# Patient Record
Sex: MALE | Race: BLACK OR AFRICAN AMERICAN | NOT HISPANIC OR LATINO | Employment: UNEMPLOYED | URBAN - METROPOLITAN AREA
[De-identification: names, ages, dates, MRNs, and addresses within clinical notes are randomized per-mention and may not be internally consistent; named-entity substitution may affect disease eponyms.]

---

## 2024-04-25 ENCOUNTER — OFFICE VISIT (OUTPATIENT)
Dept: BEHAVIORAL HEALTH | Facility: CLINIC | Age: 69
End: 2024-04-25
Payer: MEDICARE

## 2024-04-25 VITALS — SYSTOLIC BLOOD PRESSURE: 117 MMHG | HEART RATE: 81 BPM | DIASTOLIC BLOOD PRESSURE: 68 MMHG

## 2024-04-25 DIAGNOSIS — F14.11 HISTORY OF COCAINE ABUSE (MULTI): Primary | ICD-10-CM

## 2024-04-25 LAB
AMPHETAMINES UR QL SCN: ABNORMAL
BARBITURATES UR QL SCN: ABNORMAL
BZE UR QL SCN: ABNORMAL
CANNABINOIDS UR QL SCN: ABNORMAL
CREAT UR-MCNC: 268 MG/DL (ref 20–370)
PCP UR QL SCN: ABNORMAL

## 2024-04-25 PROCEDURE — 1125F AMNT PAIN NOTED PAIN PRSNT: CPT | Performed by: COUNSELOR

## 2024-04-25 PROCEDURE — 82570 ASSAY OF URINE CREATININE: CPT | Mod: 59 | Performed by: NURSE PRACTITIONER

## 2024-04-25 PROCEDURE — 90791 PSYCH DIAGNOSTIC EVALUATION: CPT | Performed by: COUNSELOR

## 2024-04-25 PROCEDURE — 80353 DRUG SCREENING COCAINE: CPT | Performed by: NURSE PRACTITIONER

## 2024-04-25 PROCEDURE — 80346 BENZODIAZEPINES1-12: CPT | Performed by: NURSE PRACTITIONER

## 2024-04-25 ASSESSMENT — PAIN SCALES - GENERAL: PAINLEVEL: 7

## 2024-04-25 NOTE — PROGRESS NOTES
"ARS Clinical Progress Note      Name: Toni Gipson  MRN: [id number]   YOB: 1955    Date: 24    Record Review: {yes/no:24614}  OARRS Reviewed: ***    Impression:    Treatment Plan/Recommendations: {ARS Plan:64315}    Diagnosis: No diagnosis found.    Complexity Issues:  Number of diagnoses or management options {# diagnoses:74936}  Problems effect on treatment and management {yes/no:56858}  Risk of complications and/or morbidity or mortality {risk of complications:19425}  Coordination of care (e.g. with patient and/or family, social workers, nursing staff, other doctors) {coor of care:42986}    Patient Response to Treatment:  Risks, benefits, side effects, drug to drug interactions and alternatives to treatment were discussed in my usual manner: {yes/no:90947}    Patient response to treatment {DESC:27379}  Reason for not reducing medication dose(s) {not reducin}    Topics Discussed:  {Topics discussed:92764}    Psychotherapy/Counseling:    Review with patient: {patient educ:27607}    Subjective   Chief Complaint: No chief complaint on file..    HPI:     Physical/Somatic Complaints  The patient lists: {complaints:06351}    Past Psychiatric History:   Previous therapy: {yes/no:557931}  Previous psychiatric treatment and medication trials: {yes***/no:51301::\"no\"}  Previous psychiatric hospitalizations: {yes***/no:60384::\"no\"}  Previous diagnoses: {yes***/no:75904::\"no\"}  Previous suicide attempts: {yes***:16289::\"no\"}  History of violence: {yes/no:810941}  Currently in treatment with ***.  Education: {education:94602}  Other pertinent history: {historical info:78110}  Depression screening was performed with standardized tool: {yes/no:}    Medical History:  No past medical history on file.  Current Medications:   No current outpatient medications on file.    Active Problems:  There are no problems to display for this patient.    Surgical History:  No past surgical history on " file.  Allergies:  Not on File    Review of Systems:    Constitutional: Denies cough, weight changes, sweats or fever  Eyes/Ears/Nose/Throat: No loss of vision, denies difficulty hearing or pain in ears, denies sore throat or harshness, denies rhinorrhea  Respiratory: Denies shortness of breath   Cardiovascular: Denies chest pain, syncope, palpitations or edema  Gastrointestinal: Denies abdominal pain, nausea or vomiting  Genitourinary: Denies incontinence, pain or frequency with urination  Neurological: Denies numbness, tingling, tremor. Denies hx seizures  Hematologic/Lymphatic: Denies bleeding disorders  Endocrine: denies hx thyroid disease or diabetes  Musculoskeletal: gait steady  Integumentary: skin warm and dry, no rash or lesions noted     Family History:  No family history on file.  Social History:  Social History     Socioeconomic History    Marital status: Single     Spouse name: Not on file    Number of children: Not on file    Years of education: Not on file    Highest education level: Not on file   Occupational History    Not on file   Tobacco Use    Smoking status: Not on file    Smokeless tobacco: Not on file   Substance and Sexual Activity    Alcohol use: Not on file    Drug use: Not on file    Sexual activity: Not on file   Other Topics Concern    Not on file   Social History Narrative    Not on file     Social Determinants of Health     Financial Resource Strain: Not on File (10/18/2019)    Received from [facility]     Financial Resource Strain     Financial Resource Strain: 0   Food Insecurity: Food Insecurity Present (3/27/2020)    Received from [facility]    Hunger Vital Sign     Worried About Running Out of Food in the Last Year: Often true     Ran Out of Food in the Last Year: Often true   Transportation Needs: Unmet Transportation Needs (3/27/2020)    Received from [facility]    PRAPARE - Transportation     Lack of Transportation (Medical): Yes     Lack of Transportation (Non-Medical): Yes  "  Physical Activity: Not on File (10/18/2019)    Received from [facility]     Physical Activity     Physical Activity: 0   Stress: Not on File (10/18/2019)    Received from [facility]     Stress     Stress: 0   Social Connections: Not on File (10/18/2019)    Received from [facility]     Social Connections     Social Connections and Isolation: 0   Intimate Partner Violence: Not on file   Housing Stability: Not on File (10/18/2019)    Received from [facility]     Housing Stability     Housin       Psychiatric Review Of Systems:    Objective        Mental Status Exam:       Homicidal intentions: {yes***/no:58651::\"no\"}  Homicidal plan: {yes***/no:68569::\"no\"}  Suicidal intentions: {yes***/no:34510::\"no\"}  Suicidal plan: {yes***/no:52686::\"no\"}    PHYSICAL EXAM  GENERAL: Alert and oriented x 3. No acute distress. Well-nourished.  HEENT: Moist mucous membranes. No scleral icterus. No cervical lymphadenopathy.  LUNGS: Clear to auscultation bilaterally. No accessory muscle use.  CARDIOVASCULAR: Regular rate and rhythm.   ABDOMEN: Soft, non-tender and non-distended. No palpable masses.  EXTREMITIES: No edema. Non-tender.?  SKIN: No rashes or lesions. Warm and Dry  NEUROLOGIC: No focal neurological deficits noted. No tremor noted.  PSYCHIATRIC: Cooperative. Appropriate mood and affect.     Results:    CIWA     Opiate withdrawal       BP Readings from Last 1 Encounters:   No data found for BP      Wt Readings from Last 1 Encounters:   No data found for Wt           Magnesium   Date Value Ref Range Status   2023 2.01 1.60 - 2.40 mg/dL Final     WBC   Date Value Ref Range Status   2023 6.2 4.4 - 11.3 x10E9/L Final     nRBC   Date Value Ref Range Status   2023 0.0 0.0 - 0.0 /100 WBC Final     RBC   Date Value Ref Range Status   2023 3.95 (L) 4.50 - 5.90 x10E12/L Final     Hemoglobin   Date Value Ref Range Status   2023 11.8 (L) 13.5 - 17.5 g/dL Final     Hematocrit   Date Value Ref Range Status   2023 36.2 " (L) 41.0 - 52.0 % Final     MCV   Date Value Ref Range Status   02/02/2023 92 80 - 100 fL Final     MCHC   Date Value Ref Range Status   02/02/2023 32.6 32.0 - 36.0 g/dL Final     RDW   Date Value Ref Range Status   02/02/2023 15.0 (H) 11.5 - 14.5 % Final     Platelets   Date Value Ref Range Status   02/02/2023 172 150 - 450 x10E9/L Final     Glucose   Date Value Ref Range Status   02/02/2023 87 74 - 99 mg/dL Final     Sodium   Date Value Ref Range Status   02/02/2023 142 136 - 145 mmol/L Final     Potassium   Date Value Ref Range Status   02/02/2023 4.2 3.5 - 5.3 mmol/L Final     Chloride   Date Value Ref Range Status   02/02/2023 107 98 - 107 mmol/L Final     Bicarbonate   Date Value Ref Range Status   02/02/2023 30 21 - 32 mmol/L Final     Anion Gap   Date Value Ref Range Status   02/02/2023 9 (L) 10 - 20 mmol/L Final     Urea Nitrogen   Date Value Ref Range Status   02/02/2023 19 6 - 23 mg/dL Final     Creatinine   Date Value Ref Range Status   02/02/2023 1.07 0.50 - 1.30 mg/dL Final     Calcium   Date Value Ref Range Status   02/02/2023 8.7 8.6 - 10.6 mg/dL Final     Albumin   Date Value Ref Range Status   02/02/2023 3.2 (L) 3.4 - 5.0 g/dL Final     Alkaline Phosphatase   Date Value Ref Range Status   01/31/2023 71 33 - 136 U/L Final     Total Protein   Date Value Ref Range Status   01/31/2023 7.1 6.4 - 8.2 g/dL Final     AST   Date Value Ref Range Status   01/31/2023 20 9 - 39 U/L Final     Total Bilirubin   Date Value Ref Range Status   01/31/2023 0.4 0.0 - 1.2 mg/dL Final     ALT (SGPT)   Date Value Ref Range Status   01/31/2023 12 10 - 52 U/L Final     Comment:      Patients treated with Sulfasalazine may generate    falsely decreased results for ALT.         Amphetamine Screen, Urine   Date Value Ref Range Status   02/01/2023 PRESUMPTIVE NEGATIVE NEGATIVE Final     Comment:      CUTOFF LEVEL:  500 NG/ML   Cross-reactivity has been reported with high concentrations   of the following drugs: buproprion,  chloroquine, chlorpromazine,   ephedrine, mephentermine, fenfluramine, phentermine,   phenylpropanolamine, pseudoephedrine, and propranolol.       Barbiturate Screen, Urine   Date Value Ref Range Status   02/01/2023 PRESUMPTIVE NEGATIVE NEGATIVE Final     Comment:      CUTOFF LEVEL: 200 NG/ML     Cannabinoid Screen, Urine   Date Value Ref Range Status   02/01/2023 PRESUMPTIVE NEGATIVE NEGATIVE Final     Comment:      CUTOFF LEVEL: 50 NG/ML     PCP Screen, Urine   Date Value Ref Range Status   02/01/2023 PRESUMPTIVE NEGATIVE NEGATIVE Final     Comment:      CUTOFF LEVEL:  25 NG/ML   Cross-reactivity has been reported with dextromethorphan.             Total time on date of patient encounter ***    Jessica Gonzales, APRN-CNP
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
## 2024-04-27 LAB — ETHYL GLUCURONIDE UR QL SCN: NEGATIVE NG/ML

## 2024-05-01 ENCOUNTER — APPOINTMENT (OUTPATIENT)
Dept: OPHTHALMOLOGY | Facility: CLINIC | Age: 69
End: 2024-05-01
Payer: MEDICARE

## 2024-05-02 LAB
1OH-MIDAZOLAM UR CFM-MCNC: <25 NG/ML
6MAM UR CFM-MCNC: <25 NG/ML
7AMINOCLONAZEPAM UR CFM-MCNC: <25 NG/ML
A-OH ALPRAZ UR CFM-MCNC: <25 NG/ML
ALPRAZ UR CFM-MCNC: <25 NG/ML
BZE UR-MCNC: >2000 NG/ML
CHLORDIAZEP UR CFM-MCNC: <25 NG/ML
CLONAZEPAM UR CFM-MCNC: <25 NG/ML
CODEINE UR CFM-MCNC: <50 NG/ML
DIAZEPAM UR CFM-MCNC: <25 NG/ML
EDDP UR CFM-MCNC: <25 NG/ML
FENTANYL UR CFM-MCNC: <2.5 NG/ML
HYDROCODONE CTO UR CFM-MCNC: <25 NG/ML
HYDROMORPHONE UR CFM-MCNC: <25 NG/ML
LORAZEPAM UR CFM-MCNC: <25 NG/ML
METHADONE UR CFM-MCNC: <25 NG/ML
MIDAZOLAM UR CFM-MCNC: <25 NG/ML
MORPHINE UR CFM-MCNC: <50 NG/ML
NORDIAZEPAM UR CFM-MCNC: <25 NG/ML
NORFENTANYL UR CFM-MCNC: <2.5 NG/ML
NORHYDROCODONE UR CFM-MCNC: <25 NG/ML
NOROXYCODONE UR CFM-MCNC: <25 NG/ML
NORTRAMADOL UR-MCNC: <50 NG/ML
OXAZEPAM UR CFM-MCNC: <25 NG/ML
OXYCODONE UR CFM-MCNC: <25 NG/ML
OXYMORPHONE UR CFM-MCNC: <25 NG/ML
TEMAZEPAM UR CFM-MCNC: <25 NG/ML
TRAMADOL UR CFM-MCNC: <50 NG/ML
ZOLPIDEM UR CFM-MCNC: <25 NG/ML
ZOLPIDEM UR-MCNC: <25 NG/ML

## 2024-05-09 ENCOUNTER — OFFICE VISIT (OUTPATIENT)
Dept: GASTROENTEROLOGY | Facility: HOSPITAL | Age: 69
End: 2024-05-09
Payer: MEDICARE

## 2024-05-09 VITALS
TEMPERATURE: 98.2 F | OXYGEN SATURATION: 98 % | HEART RATE: 70 BPM | BODY MASS INDEX: 35.35 KG/M2 | HEIGHT: 72 IN | DIASTOLIC BLOOD PRESSURE: 79 MMHG | WEIGHT: 261 LBS | SYSTOLIC BLOOD PRESSURE: 125 MMHG

## 2024-05-09 DIAGNOSIS — Z12.11 SCREENING FOR COLON CANCER: Primary | ICD-10-CM

## 2024-05-09 PROCEDURE — 99214 OFFICE O/P EST MOD 30 MIN: CPT | Performed by: STUDENT IN AN ORGANIZED HEALTH CARE EDUCATION/TRAINING PROGRAM

## 2024-05-09 PROCEDURE — 1125F AMNT PAIN NOTED PAIN PRSNT: CPT | Performed by: STUDENT IN AN ORGANIZED HEALTH CARE EDUCATION/TRAINING PROGRAM

## 2024-05-09 PROCEDURE — 1159F MED LIST DOCD IN RCRD: CPT | Performed by: STUDENT IN AN ORGANIZED HEALTH CARE EDUCATION/TRAINING PROGRAM

## 2024-05-09 PROCEDURE — 99204 OFFICE O/P NEW MOD 45 MIN: CPT | Performed by: STUDENT IN AN ORGANIZED HEALTH CARE EDUCATION/TRAINING PROGRAM

## 2024-05-09 RX ORDER — ACETAMINOPHEN 325 MG/1
650 TABLET ORAL EVERY 8 HOURS PRN
COMMUNITY

## 2024-05-09 RX ORDER — POLYETHYLENE GLYCOL 3350, SODIUM CHLORIDE, SODIUM BICARBONATE, POTASSIUM CHLORIDE 420; 11.2; 5.72; 1.48 G/4L; G/4L; G/4L; G/4L
4000 POWDER, FOR SOLUTION ORAL ONCE
Qty: 4000 ML | Refills: 0 | Status: SHIPPED | OUTPATIENT
Start: 2024-05-09 | End: 2024-05-09

## 2024-05-09 RX ORDER — CHLORHEXIDINE GLUCONATE ORAL RINSE 1.2 MG/ML
15 SOLUTION DENTAL AS NEEDED
COMMUNITY

## 2024-05-09 RX ORDER — PREDNISONE 10 MG/1
10 TABLET ORAL DAILY
COMMUNITY

## 2024-05-09 RX ORDER — ATORVASTATIN CALCIUM 80 MG/1
80 TABLET, FILM COATED ORAL DAILY
COMMUNITY

## 2024-05-09 RX ORDER — METHYLPREDNISOLONE 4 MG/1
4 TABLET ORAL DAILY
COMMUNITY

## 2024-05-09 RX ORDER — OMEPRAZOLE 40 MG/1
40 CAPSULE, DELAYED RELEASE ORAL
COMMUNITY

## 2024-05-09 RX ORDER — METOPROLOL SUCCINATE 50 MG/1
50 TABLET, EXTENDED RELEASE ORAL DAILY
COMMUNITY

## 2024-05-09 RX ORDER — ASPIRIN 81 MG/1
81 TABLET ORAL DAILY
COMMUNITY

## 2024-05-09 RX ORDER — MIRTAZAPINE 30 MG/1
30 TABLET, FILM COATED ORAL NIGHTLY
COMMUNITY

## 2024-05-09 ASSESSMENT — PAIN SCALES - GENERAL: PAINLEVEL: 7

## 2024-05-09 NOTE — PROGRESS NOTES
I saw and evaluated the patient. I personally obtained the key and critical portions of the history and physical exam or was physically present for key and critical portions performed by the resident/fellow. I reviewed the resident/fellow's documentation and discussed the patient with the resident/fellow. I agree with the resident/fellow's medical decision making as documented in the note.    I spent 15 minutes in the professional and overall care of this patient.    Kimberly Cordon MD
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
## 2024-05-10 ENCOUNTER — HOSPITAL ENCOUNTER (OUTPATIENT)
Dept: RADIOLOGY | Facility: HOSPITAL | Age: 69
Discharge: HOME | End: 2024-05-10
Payer: MEDICARE

## 2024-05-10 DIAGNOSIS — F17.210 NICOTINE DEPENDENCE, CIGARETTES, UNCOMPLICATED: ICD-10-CM

## 2024-05-10 PROCEDURE — 71271 CT THORAX LUNG CANCER SCR C-: CPT

## 2024-05-13 ENCOUNTER — APPOINTMENT (OUTPATIENT)
Dept: ORTHOPEDIC SURGERY | Facility: CLINIC | Age: 69
End: 2024-05-13
Payer: MEDICARE

## 2024-05-13 ENCOUNTER — APPOINTMENT (OUTPATIENT)
Dept: RADIOLOGY | Facility: CLINIC | Age: 69
End: 2024-05-13
Payer: MEDICARE

## 2024-05-13 DIAGNOSIS — M25.561 RIGHT KNEE PAIN, UNSPECIFIED CHRONICITY: ICD-10-CM

## 2024-05-13 DIAGNOSIS — M25.551 PAIN OF RIGHT HIP JOINT: ICD-10-CM

## 2024-05-15 ENCOUNTER — APPOINTMENT (OUTPATIENT)
Dept: OPHTHALMOLOGY | Facility: CLINIC | Age: 69
End: 2024-05-15
Payer: MEDICARE

## 2024-05-17 ENCOUNTER — APPOINTMENT (OUTPATIENT)
Dept: ORTHOPEDIC SURGERY | Facility: HOSPITAL | Age: 69
End: 2024-05-17
Payer: MEDICARE

## 2024-08-05 ENCOUNTER — APPOINTMENT (OUTPATIENT)
Dept: RADIOLOGY | Facility: CLINIC | Age: 69
End: 2024-08-05
Payer: MEDICARE

## 2024-08-06 ENCOUNTER — APPOINTMENT (OUTPATIENT)
Dept: OPHTHALMOLOGY | Facility: CLINIC | Age: 69
End: 2024-08-06
Payer: MEDICARE

## 2024-11-08 ENCOUNTER — APPOINTMENT (OUTPATIENT)
Dept: GASTROENTEROLOGY | Facility: HOSPITAL | Age: 69
End: 2024-11-08
Payer: MEDICARE

## 2024-11-18 ENCOUNTER — APPOINTMENT (OUTPATIENT)
Dept: UROLOGY | Facility: HOSPITAL | Age: 69
End: 2024-11-18
Payer: MEDICARE

## 2024-12-17 ENCOUNTER — HOSPITAL ENCOUNTER (OUTPATIENT)
Facility: HOSPITAL | Age: 69
Setting detail: OUTPATIENT SURGERY
End: 2024-12-17
Attending: UROLOGY | Admitting: UROLOGY
Payer: MEDICARE

## 2024-12-17 ENCOUNTER — APPOINTMENT (OUTPATIENT)
Dept: UROLOGY | Facility: CLINIC | Age: 69
End: 2024-12-17
Payer: MEDICARE

## 2024-12-17 VITALS — TEMPERATURE: 97.7 F

## 2024-12-17 DIAGNOSIS — N47.8 FORESKIN PROBLEM: Primary | ICD-10-CM

## 2024-12-17 DIAGNOSIS — F17.210 CIGARETTE SMOKER: ICD-10-CM

## 2024-12-17 PROCEDURE — 99204 OFFICE O/P NEW MOD 45 MIN: CPT | Performed by: UROLOGY

## 2024-12-17 PROCEDURE — 1159F MED LIST DOCD IN RCRD: CPT | Performed by: UROLOGY

## 2024-12-17 PROCEDURE — 99406 BEHAV CHNG SMOKING 3-10 MIN: CPT | Performed by: UROLOGY

## 2024-12-17 PROCEDURE — 1126F AMNT PAIN NOTED NONE PRSNT: CPT | Performed by: UROLOGY

## 2024-12-17 RX ORDER — CHLORHEXIDINE GLUCONATE 40 MG/ML
SOLUTION TOPICAL DAILY PRN
OUTPATIENT
Start: 2024-12-17

## 2024-12-17 RX ORDER — ACETAMINOPHEN 325 MG/1
975 TABLET ORAL ONCE
OUTPATIENT
Start: 2024-12-17 | End: 2024-12-17

## 2024-12-17 RX ORDER — CELECOXIB 400 MG/1
400 CAPSULE ORAL ONCE
OUTPATIENT
Start: 2024-12-17 | End: 2024-12-17

## 2024-12-17 RX ORDER — GABAPENTIN 300 MG/1
600 CAPSULE ORAL ONCE
OUTPATIENT
Start: 2024-12-17 | End: 2024-12-17

## 2024-12-17 ASSESSMENT — PAIN SCALES - GENERAL: PAINLEVEL_OUTOF10: 0-NO PAIN

## 2024-12-17 NOTE — PROGRESS NOTES
Today's visit:  NPV self referral  Resident of [place]  Wants circumcision  Wants for sanitary purposes  Unablet o retract in time ot urinate, so urinates all over skin which is causing odor    On baby asa, had stents put in over 5 years ago  Can walk up flights    Smoker      Labs  Lab Results   Component Value Date    HGBA1C 5.5 10/29/2019     Component      Latest Ref Rng 2/2/2023 2/3/2023   GLUCOSE      74 - 99 mg/dL 87     SODIUM      136 - 145 mmol/L 142     POTASSIUM      3.5 - 5.3 mmol/L 4.2     CHLORIDE      98 - 107 mmol/L 107     Bicarbonate      21 - 32 mmol/L 30     Anion Gap      10 - 20 mmol/L 9 (L)     Blood Urea Nitrogen      6 - 23 mg/dL 19     Creatinine      0.50 - 1.30 mg/dL 1.07     GFR MALE      >90 mL/min/1.73m2 76     Calcium      8.6 - 10.6 mg/dL 8.7     PHOSPHORUS      2.5 - 4.9 mg/dL 3.5     Albumin      3.4 - 5.0 g/dL 3.2 (L)     WBC      4.4 - 11.3 x10E9/L 6.2     nRBC      0.0 - 0.0 /100 WBC 0.0     RBC      4.50 - 5.90 x10E12/L 3.95 (L)     HEMOGLOBIN      13.5 - 17.5 g/dL 11.8 (L)     HEMATOCRIT      41.0 - 52.0 % 36.2 (L)     MCV      80 - 100 fL 92     MCHC      32.0 - 36.0 g/dL 32.6     Platelets      150 - 450 x10E9/L 172     RED CELL DISTRIBUTION WIDTH      11.5 - 14.5 % 15.0 (H)     POCT Glucose      74 - 99 mg/dL  90          Medications:    Current Outpatient Medications:     acetaminophen (Tylenol) 325 mg tablet, Take 2 tablets (650 mg) by mouth every 8 hours if needed for mild pain (1 - 3)., Disp: , Rfl:     aspirin 81 mg EC tablet, Take 1 tablet (81 mg) by mouth once daily., Disp: , Rfl:     atorvastatin (Lipitor) 80 mg tablet, Take 1 tablet (80 mg) by mouth once daily., Disp: , Rfl:     chlorhexidine (Peridex) 0.12 % solution, Use 15 mL in the mouth or throat if needed for wound care., Disp: , Rfl:     glycerin (ORAJEL DRY MOUTH MM), Use in the mouth or throat., Disp: , Rfl:     menthol 5 % adhesive patch,medicated, Apply topically., Disp: , Rfl:      methylPREDNISolone (Medrol) 4 mg tablet, Take 1 tablet (4 mg) by mouth once daily., Disp: , Rfl:     metoprolol succinate XL (Toprol-XL) 50 mg 24 hr tablet, Take 1 tablet (50 mg) by mouth once daily. Do not crush or chew., Disp: , Rfl:     mirtazapine (Remeron) 30 mg tablet, Take 1 tablet (30 mg) by mouth once daily at bedtime., Disp: , Rfl:     naloxone (Narcan) 4 mg/0.1 mL nasal spray, Administer 1 spray (4 mg) into affected nostril(s) if needed for opioid reversal. May repeat every 2-3 minutes if needed, alternating nostrils, until medical assistance becomes available., Disp: , Rfl:     omeprazole (PriLOSEC) 40 mg DR capsule, Take 1 capsule (40 mg) by mouth. Do not crush or chew., Disp: , Rfl:     predniSONE (Deltasone) 10 mg tablet, Take 1 tablet (10 mg) by mouth once daily., Disp: , Rfl:     sodium chloride (Ocean) 0.65 % nasal spray, Administer 1 spray into each nostril if needed for congestion., Disp: , Rfl:     Allergy:  Allergies   Allergen Reactions    Sulfa (Sulfonamide Antibiotics) Hives     swelling        Exam  CONSTITUTIONAL:        No acute distress    HEAD:        Normocephalic and atraumatic    CHEST / RESPIRATORY      no excess work of breathing, no respiratory distress,    ABDOMEN / GASTROINTESTINAL:        Abdomen nondistended    Testicles descended bilaterally, nontender, no masses  Vasa palpable bilaterally  Penis uncirc'd, no lesions, no plaques        Assessment/Plan  Redundant foreskin, hygiene issues  Discussed circumcision  Discussed r/b/a, all questions answered.  Can stay on [id number]  PATs    #Smoking Cessation   -I spent over 3 minutes of the visit counseling the patient in regards to cessation of smoking.       Will proceed with circumcision

## 2024-12-30 ENCOUNTER — APPOINTMENT (OUTPATIENT)
Dept: OPERATING ROOM | Facility: CLINIC | Age: 69
End: 2024-12-30
Payer: MEDICARE

## 2025-01-06 DIAGNOSIS — Z12.11 SCREENING FOR COLON CANCER: Primary | ICD-10-CM

## 2025-01-06 RX ORDER — POLYETHYLENE GLYCOL 3350, SODIUM CHLORIDE, SODIUM BICARBONATE, POTASSIUM CHLORIDE 420; 11.2; 5.72; 1.48 G/4L; G/4L; G/4L; G/4L
4000 POWDER, FOR SOLUTION ORAL ONCE
Status: SHIPPED | OUTPATIENT
Start: 2025-01-06

## 2025-01-06 RX ORDER — POLYETHYLENE GLYCOL 3350, SODIUM SULFATE ANHYDROUS, SODIUM BICARBONATE, SODIUM CHLORIDE, POTASSIUM CHLORIDE 236; 22.74; 6.74; 5.86; 2.97 G/4L; G/4L; G/4L; G/4L; G/4L
4 POWDER, FOR SOLUTION ORAL ONCE
Qty: 4000 ML | Refills: 0 | Status: SHIPPED | OUTPATIENT
Start: 2025-01-06 | End: 2025-01-06 | Stop reason: ALTCHOICE

## 2025-01-08 DIAGNOSIS — N47.8 FORESKIN PROBLEM: ICD-10-CM

## 2025-01-10 ENCOUNTER — TELEPHONE (OUTPATIENT)
Dept: PREADMISSION TESTING | Facility: HOSPITAL | Age: 70
End: 2025-01-10
Payer: MEDICARE

## 2025-01-13 ENCOUNTER — TELEPHONE (OUTPATIENT)
Dept: UROLOGY | Facility: HOSPITAL | Age: 70
End: 2025-01-13
Payer: MEDICARE

## 2025-01-13 ENCOUNTER — PRE-ADMISSION TESTING (OUTPATIENT)
Dept: PREADMISSION TESTING | Facility: HOSPITAL | Age: 70
End: 2025-01-13
Payer: MEDICARE

## 2025-01-13 NOTE — TELEPHONE ENCOUNTER
Received messaages from PAT department patient missed PAT appt and is refusing to reschedule. Call placed to patient, no answer, left detailed VM notifying that if PAT appointment is not completed, surgery with Dr. Munguia will be cancelled.

## 2025-01-24 ENCOUNTER — APPOINTMENT (OUTPATIENT)
Dept: OPHTHALMOLOGY | Facility: CLINIC | Age: 70
End: 2025-01-24
Payer: MEDICARE

## 2025-02-17 ENCOUNTER — APPOINTMENT (OUTPATIENT)
Dept: UROLOGY | Facility: HOSPITAL | Age: 70
End: 2025-02-17
Payer: MEDICARE

## 2025-04-07 ENCOUNTER — ANESTHESIA (OUTPATIENT)
Dept: GASTROENTEROLOGY | Facility: HOSPITAL | Age: 70
End: 2025-04-07
Payer: MEDICARE

## 2025-04-07 ENCOUNTER — ANESTHESIA EVENT (OUTPATIENT)
Dept: GASTROENTEROLOGY | Facility: HOSPITAL | Age: 70
End: 2025-04-07

## 2025-04-07 NOTE — ANESTHESIA PREPROCEDURE EVALUATION
Patient: Toni Gipson    Procedure Information       Date/Time: 04/07/25 0830    Scheduled providers: Lionel Merrill MD    Procedure: COLONOSCOPY    Location: [facility]     HPI:  62 year old M who is Wheelchair bound,  w/ hx of obesity, CAD s/p HARRY 2014, GERD, HTN, depression, OA, and cocaine use disorder who presents to GI clinic for evaluation of constipation and screening colonoscopy.    ECHO: 02/01/2023   1. Left ventricular systolic function is normal with a 60-65% estimated ejection fraction.   2. Poorly visualized anatomical structures due to suboptimal image quality.   3. RVSP within normal limits.   4. Compared with the prior exam from 10/30/2019 there has been an improvement of the LVEF from mildly reduced with an inferior wall motion abnormality to normal today. The inferior wall appears to have improved function though not well seen, even with the use of echocontrast. Otherwise there are no sigificant changes.       Relevant Problems   No relevant active problems       Clinical information reviewed:                   NPO Detail:  No data recorded     PHYSICAL EXAM    Anesthesia Plan

## 2025-04-28 ENCOUNTER — APPOINTMENT (OUTPATIENT)
Dept: ORTHOPEDIC SURGERY | Facility: CLINIC | Age: 70
End: 2025-04-28
Payer: MEDICARE

## 2025-08-20 ENCOUNTER — APPOINTMENT (OUTPATIENT)
Dept: VASCULAR MEDICINE | Facility: CLINIC | Age: 70
End: 2025-08-20
Payer: MEDICARE

## 2025-08-31 ENCOUNTER — HOSPITAL ENCOUNTER (INPATIENT)
Facility: HOSPITAL | Age: 70
LOS: 2 days | Discharge: HOME | End: 2025-09-03
Attending: EMERGENCY MEDICINE | Admitting: HOSPITALIST
Payer: MEDICARE

## 2025-08-31 ENCOUNTER — APPOINTMENT (OUTPATIENT)
Dept: CARDIOLOGY | Facility: HOSPITAL | Age: 70
End: 2025-08-31
Payer: MEDICARE

## 2025-08-31 ENCOUNTER — APPOINTMENT (OUTPATIENT)
Dept: RADIOLOGY | Facility: HOSPITAL | Age: 70
End: 2025-08-31
Payer: MEDICARE

## 2025-08-31 DIAGNOSIS — I26.99 OTHER ACUTE PULMONARY EMBOLISM WITHOUT ACUTE COR PULMONALE: Primary | ICD-10-CM

## 2025-08-31 LAB
ALBUMIN SERPL BCP-MCNC: 4 G/DL (ref 3.4–5)
ALP SERPL-CCNC: 64 U/L (ref 33–136)
ALT SERPL W P-5'-P-CCNC: 14 U/L (ref 10–52)
ANION GAP BLDV CALCULATED.4IONS-SCNC: 12 MMOL/L (ref 10–25)
ANION GAP BLDV CALCULATED.4IONS-SCNC: 25 MMOL/L (ref 10–25)
ANION GAP SERPL CALC-SCNC: 14 MMOL/L (ref 10–20)
APPEARANCE UR: CLEAR
AST SERPL W P-5'-P-CCNC: 22 U/L (ref 9–39)
BACTERIA #/AREA URNS AUTO: ABNORMAL /HPF
BASE EXCESS BLDV CALC-SCNC: -18.1 MMOL/L (ref -2–3)
BASE EXCESS BLDV CALC-SCNC: -6.7 MMOL/L (ref -2–3)
BASOPHILS # BLD AUTO: 0.02 X10*3/UL (ref 0–0.1)
BASOPHILS NFR BLD AUTO: 0.3 %
BILIRUB SERPL-MCNC: 0.6 MG/DL (ref 0–1.2)
BILIRUB UR STRIP.AUTO-MCNC: NEGATIVE MG/DL
BNP SERPL-MCNC: 59 PG/ML (ref 0–99)
BODY TEMPERATURE: 37 DEGREES CELSIUS
BODY TEMPERATURE: 37 DEGREES CELSIUS
BUN SERPL-MCNC: 21 MG/DL (ref 6–23)
CA-I BLDV-SCNC: 1.06 MMOL/L (ref 1.1–1.33)
CA-I BLDV-SCNC: 1.07 MMOL/L (ref 1.1–1.33)
CALCIUM SERPL-MCNC: 9.1 MG/DL (ref 8.6–10.3)
CARDIAC TROPONIN I PNL SERPL HS: 14 NG/L (ref 0–20)
CARDIAC TROPONIN I PNL SERPL HS: 18 NG/L (ref 0–20)
CARDIAC TROPONIN I PNL SERPL HS: 5 NG/L (ref 0–20)
CHLORIDE BLDV-SCNC: 100 MMOL/L (ref 98–107)
CHLORIDE BLDV-SCNC: 107 MMOL/L (ref 98–107)
CHLORIDE SERPL-SCNC: 105 MMOL/L (ref 98–107)
CK SERPL-CCNC: 588 U/L (ref 0–325)
CO2 SERPL-SCNC: 27 MMOL/L (ref 21–32)
COLOR UR: YELLOW
CREAT SERPL-MCNC: 1.93 MG/DL (ref 0.5–1.3)
D DIMER PPP FEU-MCNC: 4298 NG/ML FEU
EGFRCR SERPLBLD CKD-EPI 2021: 37 ML/MIN/1.73M*2
EOSINOPHIL # BLD AUTO: 0.06 X10*3/UL (ref 0–0.7)
EOSINOPHIL NFR BLD AUTO: 0.8 %
ERYTHROCYTE [DISTWIDTH] IN BLOOD BY AUTOMATED COUNT: 15.4 % (ref 11.5–14.5)
GLUCOSE BLD MANUAL STRIP-MCNC: 147 MG/DL (ref 74–99)
GLUCOSE BLDV-MCNC: 65 MG/DL (ref 74–99)
GLUCOSE BLDV-MCNC: ABNORMAL MG/DL
GLUCOSE SERPL-MCNC: 108 MG/DL (ref 74–99)
GLUCOSE UR STRIP.AUTO-MCNC: NORMAL MG/DL
HCO3 BLDV-SCNC: 18.3 MMOL/L (ref 22–26)
HCO3 BLDV-SCNC: 7.6 MMOL/L (ref 22–26)
HCT VFR BLD AUTO: 41.1 % (ref 41–52)
HCT VFR BLD EST: 10 % (ref 41–52)
HCT VFR BLD EST: 33 % (ref 41–52)
HGB BLD-MCNC: 13 G/DL (ref 13.5–17.5)
HGB BLDV-MCNC: 10.9 G/DL (ref 13.5–17.5)
HGB BLDV-MCNC: 3.4 G/DL (ref 13.5–17.5)
HYALINE CASTS #/AREA URNS AUTO: ABNORMAL /LPF
IMM GRANULOCYTES # BLD AUTO: 0.03 X10*3/UL (ref 0–0.7)
IMM GRANULOCYTES NFR BLD AUTO: 0.4 % (ref 0–0.9)
INHALED O2 CONCENTRATION: 21 %
INHALED O2 CONCENTRATION: 21 %
KETONES UR STRIP.AUTO-MCNC: NEGATIVE MG/DL
LACTATE BLDV-SCNC: 1.7 MMOL/L (ref 0.4–2)
LACTATE BLDV-SCNC: 7.3 MMOL/L (ref 0.4–2)
LACTATE BLDV-SCNC: >17 MMOL/L (ref 0.4–2)
LEUKOCYTE ESTERASE UR QL STRIP.AUTO: NEGATIVE
LYMPHOCYTES # BLD AUTO: 1.61 X10*3/UL (ref 1.2–4.8)
LYMPHOCYTES NFR BLD AUTO: 20.6 %
MCH RBC QN AUTO: 30.5 PG (ref 26–34)
MCHC RBC AUTO-ENTMCNC: 31.6 G/DL (ref 32–36)
MCV RBC AUTO: 97 FL (ref 80–100)
MONOCYTES # BLD AUTO: 0.66 X10*3/UL (ref 0.1–1)
MONOCYTES NFR BLD AUTO: 8.5 %
MUCOUS THREADS #/AREA URNS AUTO: ABNORMAL /LPF
NEUTROPHILS # BLD AUTO: 5.42 X10*3/UL (ref 1.2–7.7)
NEUTROPHILS NFR BLD AUTO: 69.4 %
NITRITE UR QL STRIP.AUTO: NEGATIVE
NRBC BLD-RTO: 0 /100 WBCS (ref 0–0)
OXYHGB MFR BLDV: 63.2 % (ref 45–75)
OXYHGB MFR BLDV: 64 % (ref 45–75)
PCO2 BLDV: 17 MM HG (ref 41–51)
PCO2 BLDV: 34 MM HG (ref 41–51)
PH BLDV: 7.26 PH (ref 7.33–7.43)
PH BLDV: 7.34 PH (ref 7.33–7.43)
PH UR STRIP.AUTO: 5.5 [PH]
PLATELET # BLD AUTO: 133 X10*3/UL (ref 150–450)
PO2 BLDV: 40 MM HG (ref 35–45)
PO2 BLDV: <6 MM HG (ref 35–45)
POTASSIUM BLDV-SCNC: 3.5 MMOL/L (ref 3.5–5.3)
POTASSIUM BLDV-SCNC: 3.8 MMOL/L (ref 3.5–5.3)
POTASSIUM SERPL-SCNC: 3.8 MMOL/L (ref 3.5–5.3)
PROT SERPL-MCNC: 7 G/DL (ref 6.4–8.2)
PROT UR STRIP.AUTO-MCNC: ABNORMAL MG/DL
RBC # BLD AUTO: 4.26 X10*6/UL (ref 4.5–5.9)
RBC # UR STRIP.AUTO: ABNORMAL MG/DL
RBC #/AREA URNS AUTO: ABNORMAL /HPF
SAO2 % BLDV: 65 % (ref 45–75)
SAO2 % BLDV: 67 % (ref 45–75)
SODIUM BLDV-SCNC: 129 MMOL/L (ref 136–145)
SODIUM BLDV-SCNC: 134 MMOL/L (ref 136–145)
SODIUM SERPL-SCNC: 142 MMOL/L (ref 136–145)
SP GR UR STRIP.AUTO: 1.03
SQUAMOUS #/AREA URNS AUTO: ABNORMAL /HPF
UROBILINOGEN UR STRIP.AUTO-MCNC: ABNORMAL MG/DL
WBC # BLD AUTO: 7.8 X10*3/UL (ref 4.4–11.3)
WBC #/AREA URNS AUTO: ABNORMAL /HPF

## 2025-08-31 PROCEDURE — 36415 COLL VENOUS BLD VENIPUNCTURE: CPT

## 2025-08-31 PROCEDURE — 85730 THROMBOPLASTIN TIME PARTIAL: CPT | Performed by: EMERGENCY MEDICINE

## 2025-08-31 PROCEDURE — 84484 ASSAY OF TROPONIN QUANT: CPT

## 2025-08-31 PROCEDURE — 84132 ASSAY OF SERUM POTASSIUM: CPT

## 2025-08-31 PROCEDURE — 71275 CT ANGIOGRAPHY CHEST: CPT | Performed by: RADIOLOGY

## 2025-08-31 PROCEDURE — 85025 COMPLETE CBC W/AUTO DIFF WBC: CPT | Performed by: EMERGENCY MEDICINE

## 2025-08-31 PROCEDURE — 84132 ASSAY OF SERUM POTASSIUM: CPT | Performed by: EMERGENCY MEDICINE

## 2025-08-31 PROCEDURE — 82550 ASSAY OF CK (CPK): CPT

## 2025-08-31 PROCEDURE — 82947 ASSAY GLUCOSE BLOOD QUANT: CPT

## 2025-08-31 PROCEDURE — 83880 ASSAY OF NATRIURETIC PEPTIDE: CPT | Performed by: EMERGENCY MEDICINE

## 2025-08-31 PROCEDURE — 93005 ELECTROCARDIOGRAM TRACING: CPT

## 2025-08-31 PROCEDURE — 71045 X-RAY EXAM CHEST 1 VIEW: CPT | Performed by: RADIOLOGY

## 2025-08-31 PROCEDURE — 83605 ASSAY OF LACTIC ACID: CPT | Performed by: EMERGENCY MEDICINE

## 2025-08-31 PROCEDURE — 80307 DRUG TEST PRSMV CHEM ANLYZR: CPT | Performed by: STUDENT IN AN ORGANIZED HEALTH CARE EDUCATION/TRAINING PROGRAM

## 2025-08-31 PROCEDURE — 71275 CT ANGIOGRAPHY CHEST: CPT

## 2025-08-31 PROCEDURE — 96361 HYDRATE IV INFUSION ADD-ON: CPT

## 2025-08-31 PROCEDURE — 84484 ASSAY OF TROPONIN QUANT: CPT | Performed by: EMERGENCY MEDICINE

## 2025-08-31 PROCEDURE — 2500000004 HC RX 250 GENERAL PHARMACY W/ HCPCS (ALT 636 FOR OP/ED)

## 2025-08-31 PROCEDURE — 87075 CULTR BACTERIA EXCEPT BLOOD: CPT | Mod: AHULAB

## 2025-08-31 PROCEDURE — 81001 URINALYSIS AUTO W/SCOPE: CPT

## 2025-08-31 PROCEDURE — 2550000001 HC RX 255 CONTRASTS: Performed by: EMERGENCY MEDICINE

## 2025-08-31 PROCEDURE — 99291 CRITICAL CARE FIRST HOUR: CPT | Performed by: EMERGENCY MEDICINE

## 2025-08-31 PROCEDURE — 85379 FIBRIN DEGRADATION QUANT: CPT

## 2025-08-31 PROCEDURE — 71045 X-RAY EXAM CHEST 1 VIEW: CPT

## 2025-08-31 RX ORDER — HEPARIN SODIUM 10000 [USP'U]/100ML
0-4500 INJECTION, SOLUTION INTRAVENOUS CONTINUOUS
Status: DISCONTINUED | OUTPATIENT
Start: 2025-08-31 | End: 2025-09-03

## 2025-08-31 RX ADMIN — IOHEXOL 76 ML: 350 INJECTION, SOLUTION INTRAVENOUS at 21:32

## 2025-08-31 RX ADMIN — SODIUM CHLORIDE, SODIUM LACTATE, POTASSIUM CHLORIDE, AND CALCIUM CHLORIDE 1000 ML: .6; .31; .03; .02 INJECTION, SOLUTION INTRAVENOUS at 19:04

## 2025-08-31 ASSESSMENT — PAIN SCALES - GENERAL
PAINLEVEL_OUTOF10: 0 - NO PAIN

## 2025-08-31 ASSESSMENT — PAIN - FUNCTIONAL ASSESSMENT: PAIN_FUNCTIONAL_ASSESSMENT: 0-10

## 2025-09-01 ENCOUNTER — APPOINTMENT (OUTPATIENT)
Dept: RADIOLOGY | Facility: HOSPITAL | Age: 70
End: 2025-09-01
Payer: MEDICARE

## 2025-09-01 PROBLEM — I26.99 OTHER ACUTE PULMONARY EMBOLISM WITHOUT ACUTE COR PULMONALE: Status: ACTIVE | Noted: 2025-09-01

## 2025-09-01 LAB
AMPHETAMINES UR QL SCN: ABNORMAL
ANION GAP SERPL CALC-SCNC: 9 MMOL/L (ref 10–20)
APTT PPP: 21 SECONDS (ref 26–36)
BARBITURATES UR QL SCN: ABNORMAL
BASOPHILS # BLD AUTO: 0.03 X10*3/UL (ref 0–0.1)
BASOPHILS NFR BLD AUTO: 0.3 %
BENZODIAZ UR QL SCN: ABNORMAL
BUN SERPL-MCNC: 21 MG/DL (ref 6–23)
BZE UR QL SCN: ABNORMAL
CALCIUM SERPL-MCNC: 8.5 MG/DL (ref 8.6–10.3)
CANNABINOIDS UR QL SCN: ABNORMAL
CARDIAC TROPONIN I PNL SERPL HS: 13 NG/L (ref 0–20)
CHLORIDE SERPL-SCNC: 108 MMOL/L (ref 98–107)
CK SERPL-CCNC: 612 U/L (ref 0–325)
CO2 SERPL-SCNC: 28 MMOL/L (ref 21–32)
CREAT SERPL-MCNC: 1.46 MG/DL (ref 0.5–1.3)
EGFRCR SERPLBLD CKD-EPI 2021: 52 ML/MIN/1.73M*2
EOSINOPHIL # BLD AUTO: 0.31 X10*3/UL (ref 0–0.7)
EOSINOPHIL NFR BLD AUTO: 3.4 %
ERYTHROCYTE [DISTWIDTH] IN BLOOD BY AUTOMATED COUNT: 15.4 % (ref 11.5–14.5)
FENTANYL+NORFENTANYL UR QL SCN: ABNORMAL
GLUCOSE SERPL-MCNC: 104 MG/DL (ref 74–99)
HCT VFR BLD AUTO: 36.4 % (ref 41–52)
HGB BLD-MCNC: 11.8 G/DL (ref 13.5–17.5)
IMM GRANULOCYTES # BLD AUTO: 0.03 X10*3/UL (ref 0–0.7)
IMM GRANULOCYTES NFR BLD AUTO: 0.3 % (ref 0–0.9)
LYMPHOCYTES # BLD AUTO: 3.88 X10*3/UL (ref 1.2–4.8)
LYMPHOCYTES NFR BLD AUTO: 43 %
MAGNESIUM SERPL-MCNC: 1.83 MG/DL (ref 1.6–2.4)
MCH RBC QN AUTO: 31.1 PG (ref 26–34)
MCHC RBC AUTO-ENTMCNC: 32.4 G/DL (ref 32–36)
MCV RBC AUTO: 96 FL (ref 80–100)
METHADONE UR QL SCN: ABNORMAL
MONOCYTES # BLD AUTO: 0.95 X10*3/UL (ref 0.1–1)
MONOCYTES NFR BLD AUTO: 10.5 %
NEUTROPHILS # BLD AUTO: 3.83 X10*3/UL (ref 1.2–7.7)
NEUTROPHILS NFR BLD AUTO: 42.5 %
NRBC BLD-RTO: 0 /100 WBCS (ref 0–0)
OPIATES UR QL SCN: ABNORMAL
OXYCODONE+OXYMORPHONE UR QL SCN: ABNORMAL
PCP UR QL SCN: ABNORMAL
PLATELET # BLD AUTO: 129 X10*3/UL (ref 150–450)
POTASSIUM SERPL-SCNC: 3.5 MMOL/L (ref 3.5–5.3)
RBC # BLD AUTO: 3.8 X10*6/UL (ref 4.5–5.9)
SODIUM SERPL-SCNC: 141 MMOL/L (ref 136–145)
UFH PPP CHRO-ACNC: 0.5 IU/ML (ref ?–1.1)
UFH PPP CHRO-ACNC: 0.6 IU/ML (ref ?–1.1)
UFH PPP CHRO-ACNC: 0.7 IU/ML (ref ?–1.1)
UFH PPP CHRO-ACNC: 1.4 IU/ML (ref ?–1.1)
UFH PPP CHRO-ACNC: 1.9 IU/ML (ref ?–1.1)
WBC # BLD AUTO: 9 X10*3/UL (ref 4.4–11.3)

## 2025-09-01 PROCEDURE — 82550 ASSAY OF CK (CPK): CPT

## 2025-09-01 PROCEDURE — 76770 US EXAM ABDO BACK WALL COMP: CPT

## 2025-09-01 PROCEDURE — 99291 CRITICAL CARE FIRST HOUR: CPT | Performed by: EMERGENCY MEDICINE

## 2025-09-01 PROCEDURE — 83735 ASSAY OF MAGNESIUM: CPT | Performed by: HOSPITALIST

## 2025-09-01 PROCEDURE — 36415 COLL VENOUS BLD VENIPUNCTURE: CPT | Performed by: HOSPITALIST

## 2025-09-01 PROCEDURE — 85520 HEPARIN ASSAY: CPT | Performed by: STUDENT IN AN ORGANIZED HEALTH CARE EDUCATION/TRAINING PROGRAM

## 2025-09-01 PROCEDURE — 80048 BASIC METABOLIC PNL TOTAL CA: CPT | Performed by: HOSPITALIST

## 2025-09-01 PROCEDURE — 93971 EXTREMITY STUDY: CPT | Performed by: RADIOLOGY

## 2025-09-01 PROCEDURE — 84484 ASSAY OF TROPONIN QUANT: CPT | Performed by: EMERGENCY MEDICINE

## 2025-09-01 PROCEDURE — 76770 US EXAM ABDO BACK WALL COMP: CPT | Performed by: RADIOLOGY

## 2025-09-01 PROCEDURE — 96374 THER/PROPH/DIAG INJ IV PUSH: CPT

## 2025-09-01 PROCEDURE — 85025 COMPLETE CBC W/AUTO DIFF WBC: CPT | Performed by: HOSPITALIST

## 2025-09-01 PROCEDURE — 36415 COLL VENOUS BLD VENIPUNCTURE: CPT | Performed by: EMERGENCY MEDICINE

## 2025-09-01 PROCEDURE — 2500000004 HC RX 250 GENERAL PHARMACY W/ HCPCS (ALT 636 FOR OP/ED): Performed by: EMERGENCY MEDICINE

## 2025-09-01 PROCEDURE — 1200000002 HC GENERAL ROOM WITH TELEMETRY DAILY

## 2025-09-01 PROCEDURE — 93970 EXTREMITY STUDY: CPT

## 2025-09-01 PROCEDURE — 2500000001 HC RX 250 WO HCPCS SELF ADMINISTERED DRUGS (ALT 637 FOR MEDICARE OP): Performed by: HOSPITALIST

## 2025-09-01 PROCEDURE — 99223 1ST HOSP IP/OBS HIGH 75: CPT | Performed by: HOSPITALIST

## 2025-09-01 PROCEDURE — 85520 HEPARIN ASSAY: CPT | Performed by: EMERGENCY MEDICINE

## 2025-09-01 PROCEDURE — 2500000005 HC RX 250 GENERAL PHARMACY W/O HCPCS: Performed by: STUDENT IN AN ORGANIZED HEALTH CARE EDUCATION/TRAINING PROGRAM

## 2025-09-01 RX ORDER — ONDANSETRON 4 MG/1
4 TABLET, ORALLY DISINTEGRATING ORAL EVERY 8 HOURS PRN
Status: DISCONTINUED | OUTPATIENT
Start: 2025-09-01 | End: 2025-09-03 | Stop reason: HOSPADM

## 2025-09-01 RX ORDER — ATORVASTATIN CALCIUM 80 MG/1
80 TABLET, FILM COATED ORAL DAILY
Status: DISCONTINUED | OUTPATIENT
Start: 2025-09-01 | End: 2025-09-03 | Stop reason: HOSPADM

## 2025-09-01 RX ORDER — ONDANSETRON HYDROCHLORIDE 2 MG/ML
4 INJECTION, SOLUTION INTRAVENOUS EVERY 8 HOURS PRN
Status: DISCONTINUED | OUTPATIENT
Start: 2025-09-01 | End: 2025-09-03 | Stop reason: HOSPADM

## 2025-09-01 RX ORDER — MORPHINE SULFATE 2 MG/ML
2 INJECTION, SOLUTION INTRAMUSCULAR; INTRAVENOUS EVERY 4 HOURS PRN
Status: DISCONTINUED | OUTPATIENT
Start: 2025-09-01 | End: 2025-09-03 | Stop reason: HOSPADM

## 2025-09-01 RX ORDER — ASPIRIN 81 MG/1
81 TABLET ORAL DAILY
Status: DISCONTINUED | OUTPATIENT
Start: 2025-09-01 | End: 2025-09-03 | Stop reason: HOSPADM

## 2025-09-01 RX ORDER — ACETAMINOPHEN 325 MG/1
650 TABLET ORAL EVERY 4 HOURS PRN
Status: DISCONTINUED | OUTPATIENT
Start: 2025-09-01 | End: 2025-09-03 | Stop reason: HOSPADM

## 2025-09-01 RX ORDER — OXYCODONE HYDROCHLORIDE 5 MG/1
5 TABLET ORAL EVERY 6 HOURS PRN
Refills: 0 | Status: DISCONTINUED | OUTPATIENT
Start: 2025-09-01 | End: 2025-09-03 | Stop reason: HOSPADM

## 2025-09-01 RX ORDER — METOPROLOL SUCCINATE 50 MG/1
50 TABLET, EXTENDED RELEASE ORAL DAILY
Status: DISCONTINUED | OUTPATIENT
Start: 2025-09-01 | End: 2025-09-01

## 2025-09-01 RX ORDER — CARVEDILOL 3.12 MG/1
3.12 TABLET ORAL 2 TIMES DAILY
Status: DISCONTINUED | OUTPATIENT
Start: 2025-09-02 | End: 2025-09-03 | Stop reason: HOSPADM

## 2025-09-01 RX ORDER — CARVEDILOL 3.12 MG/1
3.12 TABLET ORAL 2 TIMES DAILY
Status: DISCONTINUED | OUTPATIENT
Start: 2025-09-01 | End: 2025-09-01

## 2025-09-01 RX ORDER — MIRTAZAPINE 15 MG/1
30 TABLET, FILM COATED ORAL NIGHTLY
Status: DISCONTINUED | OUTPATIENT
Start: 2025-09-01 | End: 2025-09-03 | Stop reason: HOSPADM

## 2025-09-01 RX ORDER — PANTOPRAZOLE SODIUM 40 MG/1
40 TABLET, DELAYED RELEASE ORAL
Status: DISCONTINUED | OUTPATIENT
Start: 2025-09-01 | End: 2025-09-03 | Stop reason: HOSPADM

## 2025-09-01 RX ORDER — SENNOSIDES 8.6 MG/1
2 TABLET ORAL 2 TIMES DAILY
Status: DISCONTINUED | OUTPATIENT
Start: 2025-09-01 | End: 2025-09-03 | Stop reason: HOSPADM

## 2025-09-01 RX ADMIN — ACETAMINOPHEN 650 MG: 325 TABLET ORAL at 18:24

## 2025-09-01 RX ADMIN — OXYCODONE HYDROCHLORIDE 5 MG: 5 TABLET ORAL at 13:12

## 2025-09-01 RX ADMIN — SENNOSIDES 17.2 MG: 8.6 TABLET, FILM COATED ORAL at 20:32

## 2025-09-01 RX ADMIN — HEPARIN SODIUM 2000 UNITS/HR: 10000 INJECTION, SOLUTION INTRAVENOUS at 00:55

## 2025-09-01 RX ADMIN — ATORVASTATIN CALCIUM 80 MG: 80 TABLET, FILM COATED ORAL at 08:47

## 2025-09-01 RX ADMIN — HEPARIN SODIUM 1700 UNITS/HR: 10000 INJECTION, SOLUTION INTRAVENOUS at 11:45

## 2025-09-01 RX ADMIN — HEPARIN SODIUM 1700 UNITS/HR: 10000 INJECTION, SOLUTION INTRAVENOUS at 13:50

## 2025-09-01 RX ADMIN — MIRTAZAPINE 30 MG: 15 TABLET, FILM COATED ORAL at 20:32

## 2025-09-01 RX ADMIN — Medication: at 11:15

## 2025-09-01 RX ADMIN — OXYCODONE HYDROCHLORIDE 5 MG: 5 TABLET ORAL at 21:13

## 2025-09-01 RX ADMIN — ASPIRIN 81 MG: 81 TABLET, DELAYED RELEASE ORAL at 08:47

## 2025-09-01 RX ADMIN — ACETAMINOPHEN 650 MG: 325 TABLET ORAL at 04:34

## 2025-09-01 RX ADMIN — SENNOSIDES 17.2 MG: 8.6 TABLET, FILM COATED ORAL at 08:47

## 2025-09-01 RX ADMIN — PANTOPRAZOLE SODIUM 40 MG: 40 TABLET, DELAYED RELEASE ORAL at 07:47

## 2025-09-01 SDOH — SOCIAL STABILITY: SOCIAL INSECURITY: HAS ANYONE EVER THREATENED TO HURT YOUR FAMILY OR YOUR PETS?: NO

## 2025-09-01 SDOH — SOCIAL STABILITY: SOCIAL INSECURITY: WERE YOU ABLE TO COMPLETE ALL THE BEHAVIORAL HEALTH SCREENINGS?: YES

## 2025-09-01 SDOH — ECONOMIC STABILITY: INCOME INSECURITY: IN THE PAST 12 MONTHS HAS THE ELECTRIC, GAS, OIL, OR WATER COMPANY THREATENED TO SHUT OFF SERVICES IN YOUR HOME?: NO

## 2025-09-01 SDOH — SOCIAL STABILITY: SOCIAL INSECURITY
WITHIN THE LAST YEAR, HAVE YOU BEEN RAPED OR FORCED TO HAVE ANY KIND OF SEXUAL ACTIVITY BY YOUR PARTNER OR EX-PARTNER?: NO

## 2025-09-01 SDOH — ECONOMIC STABILITY: HOUSING INSECURITY: AT ANY TIME IN THE PAST 12 MONTHS, WERE YOU HOMELESS OR LIVING IN A SHELTER (INCLUDING NOW)?: NO

## 2025-09-01 SDOH — SOCIAL STABILITY: SOCIAL INSECURITY: WITHIN THE LAST YEAR, HAVE YOU BEEN HUMILIATED OR EMOTIONALLY ABUSED IN OTHER WAYS BY YOUR PARTNER OR EX-PARTNER?: NO

## 2025-09-01 SDOH — SOCIAL STABILITY: SOCIAL INSECURITY
WITHIN THE LAST YEAR, HAVE YOU BEEN KICKED, HIT, SLAPPED, OR OTHERWISE PHYSICALLY HURT BY YOUR PARTNER OR EX-PARTNER?: NO

## 2025-09-01 SDOH — ECONOMIC STABILITY: FOOD INSECURITY: HOW HARD IS IT FOR YOU TO PAY FOR THE VERY BASICS LIKE FOOD, HOUSING, MEDICAL CARE, AND HEATING?: NOT HARD AT ALL

## 2025-09-01 SDOH — ECONOMIC STABILITY: HOUSING INSECURITY: IN THE PAST 12 MONTHS, HOW MANY TIMES HAVE YOU MOVED WHERE YOU WERE LIVING?: 1

## 2025-09-01 SDOH — SOCIAL STABILITY: SOCIAL INSECURITY: DO YOU FEEL ANYONE HAS EXPLOITED OR TAKEN ADVANTAGE OF YOU FINANCIALLY OR OF YOUR PERSONAL PROPERTY?: NO

## 2025-09-01 SDOH — ECONOMIC STABILITY: HOUSING INSECURITY: IN THE LAST 12 MONTHS, WAS THERE A TIME WHEN YOU WERE NOT ABLE TO PAY THE MORTGAGE OR RENT ON TIME?: NO

## 2025-09-01 SDOH — SOCIAL STABILITY: SOCIAL INSECURITY: ARE YOU OR HAVE YOU BEEN THREATENED OR ABUSED PHYSICALLY, EMOTIONALLY, OR SEXUALLY BY ANYONE?: NO

## 2025-09-01 SDOH — SOCIAL STABILITY: SOCIAL INSECURITY: WITHIN THE LAST YEAR, HAVE YOU BEEN AFRAID OF YOUR PARTNER OR EX-PARTNER?: NO

## 2025-09-01 SDOH — ECONOMIC STABILITY: FOOD INSECURITY: WITHIN THE PAST 12 MONTHS, YOU WORRIED THAT YOUR FOOD WOULD RUN OUT BEFORE YOU GOT THE MONEY TO BUY MORE.: NEVER TRUE

## 2025-09-01 SDOH — ECONOMIC STABILITY: FOOD INSECURITY: WITHIN THE PAST 12 MONTHS, THE FOOD YOU BOUGHT JUST DIDN'T LAST AND YOU DIDN'T HAVE MONEY TO GET MORE.: NEVER TRUE

## 2025-09-01 SDOH — SOCIAL STABILITY: SOCIAL INSECURITY: HAVE YOU HAD ANY THOUGHTS OF HARMING ANYONE ELSE?: NO

## 2025-09-01 SDOH — SOCIAL STABILITY: SOCIAL INSECURITY: DOES ANYONE TRY TO KEEP YOU FROM HAVING/CONTACTING OTHER FRIENDS OR DOING THINGS OUTSIDE YOUR HOME?: NO

## 2025-09-01 SDOH — SOCIAL STABILITY: SOCIAL INSECURITY: HAVE YOU HAD THOUGHTS OF HARMING ANYONE ELSE?: NO

## 2025-09-01 SDOH — ECONOMIC STABILITY: TRANSPORTATION INSECURITY: IN THE PAST 12 MONTHS, HAS LACK OF TRANSPORTATION KEPT YOU FROM MEDICAL APPOINTMENTS OR FROM GETTING MEDICATIONS?: NO

## 2025-09-01 SDOH — SOCIAL STABILITY: SOCIAL INSECURITY: ABUSE: ADULT

## 2025-09-01 SDOH — SOCIAL STABILITY: SOCIAL INSECURITY: DO YOU FEEL UNSAFE GOING BACK TO THE PLACE WHERE YOU ARE LIVING?: NO

## 2025-09-01 SDOH — SOCIAL STABILITY: SOCIAL INSECURITY: ARE THERE ANY APPARENT SIGNS OF INJURIES/BEHAVIORS THAT COULD BE RELATED TO ABUSE/NEGLECT?: NO

## 2025-09-01 ASSESSMENT — COGNITIVE AND FUNCTIONAL STATUS - GENERAL
DAILY ACTIVITIY SCORE: 24
DAILY ACTIVITIY SCORE: 24
MOVING TO AND FROM BED TO CHAIR: A LITTLE
STANDING UP FROM CHAIR USING ARMS: A LITTLE
WALKING IN HOSPITAL ROOM: A LITTLE
DAILY ACTIVITIY SCORE: 24
CLIMB 3 TO 5 STEPS WITH RAILING: A LITTLE
STANDING UP FROM CHAIR USING ARMS: A LITTLE
WALKING IN HOSPITAL ROOM: A LITTLE
MOBILITY SCORE: 20
CLIMB 3 TO 5 STEPS WITH RAILING: A LITTLE
MOVING TO AND FROM BED TO CHAIR: A LITTLE
MOBILITY SCORE: 20
PATIENT BASELINE BEDBOUND: NO
MOBILITY SCORE: 24

## 2025-09-01 ASSESSMENT — PAIN - FUNCTIONAL ASSESSMENT
PAIN_FUNCTIONAL_ASSESSMENT: 0-10

## 2025-09-01 ASSESSMENT — ENCOUNTER SYMPTOMS
PSYCHIATRIC NEGATIVE: 1
CHEST TIGHTNESS: 1
NEUROLOGICAL NEGATIVE: 1
GASTROINTESTINAL NEGATIVE: 1
PALPITATIONS: 1
SHORTNESS OF BREATH: 1
EYES NEGATIVE: 1
MUSCULOSKELETAL NEGATIVE: 1
CONSTITUTIONAL NEGATIVE: 1
ALLERGIC/IMMUNOLOGIC NEGATIVE: 1
HEMATOLOGIC/LYMPHATIC NEGATIVE: 1

## 2025-09-01 ASSESSMENT — PATIENT HEALTH QUESTIONNAIRE - PHQ9
2. FEELING DOWN, DEPRESSED OR HOPELESS: NOT AT ALL
1. LITTLE INTEREST OR PLEASURE IN DOING THINGS: NOT AT ALL
SUM OF ALL RESPONSES TO PHQ9 QUESTIONS 1 & 2: 0

## 2025-09-01 ASSESSMENT — PAIN SCALES - GENERAL
PAINLEVEL_OUTOF10: 0 - NO PAIN
PAINLEVEL_OUTOF10: 6
PAINLEVEL_OUTOF10: 8

## 2025-09-01 ASSESSMENT — ACTIVITIES OF DAILY LIVING (ADL)
GROOMING: INDEPENDENT
LACK_OF_TRANSPORTATION: NO
ADEQUATE_TO_COMPLETE_ADL: YES
FEEDING YOURSELF: INDEPENDENT
WALKS IN HOME: INDEPENDENT
ASSISTIVE_DEVICE: CANE;WHEELCHAIR;WALKER
LACK_OF_TRANSPORTATION: NO
TOILETING: INDEPENDENT
HEARING - RIGHT EAR: FUNCTIONAL
LACK_OF_TRANSPORTATION: NO
HEARING - LEFT EAR: FUNCTIONAL
PATIENT'S MEMORY ADEQUATE TO SAFELY COMPLETE DAILY ACTIVITIES?: YES
LACK_OF_TRANSPORTATION: NO
BATHING: INDEPENDENT
DRESSING YOURSELF: INDEPENDENT
JUDGMENT_ADEQUATE_SAFELY_COMPLETE_DAILY_ACTIVITIES: YES

## 2025-09-01 ASSESSMENT — LIFESTYLE VARIABLES
AUDIT-C TOTAL SCORE: 2
HOW OFTEN DO YOU HAVE A DRINK CONTAINING ALCOHOL: MONTHLY OR LESS
AUDIT-C TOTAL SCORE: 2
HOW MANY STANDARD DRINKS CONTAINING ALCOHOL DO YOU HAVE ON A TYPICAL DAY: 3 OR 4
SKIP TO QUESTIONS 9-10: 0
HOW OFTEN DO YOU HAVE 6 OR MORE DRINKS ON ONE OCCASION: NEVER

## 2025-09-01 ASSESSMENT — PAIN DESCRIPTION - DESCRIPTORS: DESCRIPTORS: ACHING

## 2025-09-02 ENCOUNTER — APPOINTMENT (OUTPATIENT)
Dept: CARDIOLOGY | Facility: HOSPITAL | Age: 70
End: 2025-09-02
Payer: MEDICARE

## 2025-09-02 LAB
ATRIAL RATE: 85 BPM
HOLD SPECIMEN: NORMAL
P AXIS: 56 DEGREES
P OFFSET: 212 MS
P ONSET: 148 MS
PR INTERVAL: 156 MS
Q ONSET: 226 MS
QRS COUNT: 14 BEATS
QRS DURATION: 90 MS
QT INTERVAL: 402 MS
QTC CALCULATION(BAZETT): 478 MS
QTC FREDERICIA: 451 MS
R AXIS: 2 DEGREES
T AXIS: 35 DEGREES
T OFFSET: 427 MS
VENTRICULAR RATE: 85 BPM

## 2025-09-02 PROCEDURE — 2500000001 HC RX 250 WO HCPCS SELF ADMINISTERED DRUGS (ALT 637 FOR MEDICARE OP): Performed by: HOSPITALIST

## 2025-09-02 PROCEDURE — 1200000002 HC GENERAL ROOM WITH TELEMETRY DAILY

## 2025-09-02 PROCEDURE — 93306 TTE W/DOPPLER COMPLETE: CPT | Performed by: INTERNAL MEDICINE

## 2025-09-02 PROCEDURE — 2500000004 HC RX 250 GENERAL PHARMACY W/ HCPCS (ALT 636 FOR OP/ED): Performed by: EMERGENCY MEDICINE

## 2025-09-02 PROCEDURE — 99232 SBSQ HOSP IP/OBS MODERATE 35: CPT | Performed by: INTERNAL MEDICINE

## 2025-09-02 PROCEDURE — 93306 TTE W/DOPPLER COMPLETE: CPT

## 2025-09-02 PROCEDURE — 2500000001 HC RX 250 WO HCPCS SELF ADMINISTERED DRUGS (ALT 637 FOR MEDICARE OP): Performed by: STUDENT IN AN ORGANIZED HEALTH CARE EDUCATION/TRAINING PROGRAM

## 2025-09-02 RX ADMIN — MIRTAZAPINE 30 MG: 15 TABLET, FILM COATED ORAL at 21:51

## 2025-09-02 RX ADMIN — CARVEDILOL 3.12 MG: 3.12 TABLET, FILM COATED ORAL at 09:01

## 2025-09-02 RX ADMIN — HEPARIN SODIUM 1700 UNITS/HR: 10000 INJECTION, SOLUTION INTRAVENOUS at 21:59

## 2025-09-02 RX ADMIN — OXYCODONE HYDROCHLORIDE 5 MG: 5 TABLET ORAL at 09:01

## 2025-09-02 RX ADMIN — OXYCODONE HYDROCHLORIDE 5 MG: 5 TABLET ORAL at 21:51

## 2025-09-02 RX ADMIN — CARVEDILOL 3.12 MG: 3.12 TABLET, FILM COATED ORAL at 21:51

## 2025-09-02 RX ADMIN — PANTOPRAZOLE SODIUM 40 MG: 40 TABLET, DELAYED RELEASE ORAL at 06:42

## 2025-09-02 RX ADMIN — Medication: at 09:14

## 2025-09-02 RX ADMIN — SENNOSIDES 17.2 MG: 8.6 TABLET, FILM COATED ORAL at 09:01

## 2025-09-02 RX ADMIN — HEPARIN SODIUM 1700 UNITS/HR: 10000 INJECTION, SOLUTION INTRAVENOUS at 04:47

## 2025-09-02 RX ADMIN — ATORVASTATIN CALCIUM 80 MG: 80 TABLET, FILM COATED ORAL at 09:01

## 2025-09-02 ASSESSMENT — COGNITIVE AND FUNCTIONAL STATUS - GENERAL
DAILY ACTIVITIY SCORE: 24
CLIMB 3 TO 5 STEPS WITH RAILING: A LITTLE
STANDING UP FROM CHAIR USING ARMS: A LITTLE
WALKING IN HOSPITAL ROOM: A LITTLE
MOVING TO AND FROM BED TO CHAIR: A LITTLE
MOBILITY SCORE: 20

## 2025-09-02 ASSESSMENT — PAIN DESCRIPTION - DESCRIPTORS: DESCRIPTORS: ACHING

## 2025-09-02 ASSESSMENT — PAIN SCALES - GENERAL
PAINLEVEL_OUTOF10: 7
PAINLEVEL_OUTOF10: 0 - NO PAIN
PAINLEVEL_OUTOF10: 0 - NO PAIN
PAINLEVEL_OUTOF10: 4
PAINLEVEL_OUTOF10: 5 - MODERATE PAIN

## 2025-09-02 ASSESSMENT — PAIN - FUNCTIONAL ASSESSMENT
PAIN_FUNCTIONAL_ASSESSMENT: 0-10

## 2025-09-02 ASSESSMENT — PAIN DESCRIPTION - LOCATION: LOCATION: SHOULDER

## 2025-09-02 ASSESSMENT — PAIN DESCRIPTION - ORIENTATION: ORIENTATION: LEFT

## 2025-09-03 ENCOUNTER — PHARMACY VISIT (OUTPATIENT)
Dept: PHARMACY | Facility: CLINIC | Age: 70
End: 2025-09-03
Payer: COMMERCIAL

## 2025-09-03 VITALS
DIASTOLIC BLOOD PRESSURE: 84 MMHG | BODY MASS INDEX: 40.63 KG/M2 | HEIGHT: 72 IN | RESPIRATION RATE: 20 BRPM | HEART RATE: 63 BPM | SYSTOLIC BLOOD PRESSURE: 146 MMHG | OXYGEN SATURATION: 97 % | TEMPERATURE: 96.6 F | WEIGHT: 300 LBS

## 2025-09-03 LAB
ANION GAP SERPL CALC-SCNC: 12 MMOL/L (ref 10–20)
AORTIC VALVE MEAN GRADIENT: 2 MMHG
AORTIC VALVE PEAK VELOCITY: 1.01 M/S
AV PEAK GRADIENT: 4 MMHG
AVA (PEAK VEL): 2.48 CM2
AVA (VTI): 2.4 CM2
BUN SERPL-MCNC: 14 MG/DL (ref 6–23)
CALCIUM SERPL-MCNC: 9.1 MG/DL (ref 8.6–10.3)
CHLORIDE SERPL-SCNC: 105 MMOL/L (ref 98–107)
CO2 SERPL-SCNC: 28 MMOL/L (ref 21–32)
CREAT SERPL-MCNC: 1.23 MG/DL (ref 0.5–1.3)
EGFRCR SERPLBLD CKD-EPI 2021: 64 ML/MIN/1.73M*2
EJECTION FRACTION APICAL 4 CHAMBER: 65.3
EJECTION FRACTION: 61 %
GLUCOSE SERPL-MCNC: 90 MG/DL (ref 74–99)
LEFT ATRIUM VOLUME AREA LENGTH INDEX BSA: 16.4 ML/M2
LEFT VENTRICLE INTERNAL DIMENSION DIASTOLE: 3.91 CM (ref 3.5–6)
LEFT VENTRICULAR OUTFLOW TRACT DIAMETER: 2.01 CM
MITRAL VALVE E/A RATIO: 1.04
POTASSIUM SERPL-SCNC: 3.9 MMOL/L (ref 3.5–5.3)
RIGHT VENTRICLE FREE WALL PEAK S': 11 CM/S
SODIUM SERPL-SCNC: 141 MMOL/L (ref 136–145)
TRICUSPID ANNULAR PLANE SYSTOLIC EXCURSION: 2.5 CM
UFH PPP CHRO-ACNC: 0.9 IU/ML (ref ?–1.1)
UFH PPP CHRO-ACNC: 1 IU/ML (ref ?–1.1)

## 2025-09-03 PROCEDURE — 82374 ASSAY BLOOD CARBON DIOXIDE: CPT | Performed by: STUDENT IN AN ORGANIZED HEALTH CARE EDUCATION/TRAINING PROGRAM

## 2025-09-03 PROCEDURE — 2500000004 HC RX 250 GENERAL PHARMACY W/ HCPCS (ALT 636 FOR OP/ED): Performed by: EMERGENCY MEDICINE

## 2025-09-03 PROCEDURE — 85520 HEPARIN ASSAY: CPT | Performed by: INTERNAL MEDICINE

## 2025-09-03 PROCEDURE — 99239 HOSP IP/OBS DSCHRG MGMT >30: CPT | Performed by: INTERNAL MEDICINE

## 2025-09-03 PROCEDURE — 2500000001 HC RX 250 WO HCPCS SELF ADMINISTERED DRUGS (ALT 637 FOR MEDICARE OP): Performed by: STUDENT IN AN ORGANIZED HEALTH CARE EDUCATION/TRAINING PROGRAM

## 2025-09-03 PROCEDURE — 36415 COLL VENOUS BLD VENIPUNCTURE: CPT | Performed by: INTERNAL MEDICINE

## 2025-09-03 PROCEDURE — 2500000005 HC RX 250 GENERAL PHARMACY W/O HCPCS: Performed by: HOSPITALIST

## 2025-09-03 PROCEDURE — 2500000001 HC RX 250 WO HCPCS SELF ADMINISTERED DRUGS (ALT 637 FOR MEDICARE OP): Performed by: INTERNAL MEDICINE

## 2025-09-03 PROCEDURE — RXMED WILLOW AMBULATORY MEDICATION CHARGE

## 2025-09-03 PROCEDURE — 2500000001 HC RX 250 WO HCPCS SELF ADMINISTERED DRUGS (ALT 637 FOR MEDICARE OP): Performed by: HOSPITALIST

## 2025-09-03 RX ORDER — OXYCODONE HYDROCHLORIDE 5 MG/1
5 TABLET ORAL EVERY 6 HOURS PRN
Qty: 15 TABLET | Refills: 0 | Status: SHIPPED | OUTPATIENT
Start: 2025-09-03

## 2025-09-03 RX ORDER — SENNOSIDES 8.6 MG/1
2 TABLET ORAL 2 TIMES DAILY
Qty: 40 TABLET | Refills: 0 | Status: SHIPPED | OUTPATIENT
Start: 2025-09-03 | End: 2025-09-13

## 2025-09-03 RX ADMIN — OXYCODONE HYDROCHLORIDE 5 MG: 5 TABLET ORAL at 13:26

## 2025-09-03 RX ADMIN — INFLUENZA A VIRUS A/VICTORIA/4897/2022 IVR-238 (H1N1) ANTIGEN (FORMALDEHYDE INACTIVATED), INFLUENZA A VIRUS A/CROATIA/10136/RV/2023 (H3N2) ANTIGEN (FORMALDEHYDE INACTIVATED), AND INFLUENZA B VIRUS B/MICHIGAN/01/2021 ANTIGEN (FORMALDEHYDE INACTIVATED) 0.5 ML: 60; 60; 60 INJECTION, SUSPENSION INTRAMUSCULAR at 16:29

## 2025-09-03 RX ADMIN — HEPARIN SODIUM 1500 UNITS/HR: 10000 INJECTION, SOLUTION INTRAVENOUS at 08:03

## 2025-09-03 RX ADMIN — PANTOPRAZOLE SODIUM 40 MG: 40 TABLET, DELAYED RELEASE ORAL at 06:23

## 2025-09-03 RX ADMIN — CARVEDILOL 3.12 MG: 3.12 TABLET, FILM COATED ORAL at 08:23

## 2025-09-03 RX ADMIN — Medication 1 APPLICATION: at 08:25

## 2025-09-03 RX ADMIN — OXYCODONE HYDROCHLORIDE 5 MG: 5 TABLET ORAL at 06:59

## 2025-09-03 RX ADMIN — APIXABAN 10 MG: 5 TABLET, FILM COATED ORAL at 11:59

## 2025-09-03 RX ADMIN — ATORVASTATIN CALCIUM 80 MG: 80 TABLET, FILM COATED ORAL at 08:23

## 2025-09-03 ASSESSMENT — PAIN SCALES - GENERAL
PAINLEVEL_OUTOF10: 6
PAINLEVEL_OUTOF10: 6
PAINLEVEL_OUTOF10: 7
PAINLEVEL_OUTOF10: 5 - MODERATE PAIN

## 2025-09-03 ASSESSMENT — PAIN DESCRIPTION - LOCATION
LOCATION: SHOULDER
LOCATION: SHOULDER

## 2025-09-03 ASSESSMENT — PAIN DESCRIPTION - ORIENTATION
ORIENTATION: LEFT
ORIENTATION: LEFT

## 2025-09-03 ASSESSMENT — PAIN - FUNCTIONAL ASSESSMENT
PAIN_FUNCTIONAL_ASSESSMENT: 0-10

## 2025-09-03 ASSESSMENT — COGNITIVE AND FUNCTIONAL STATUS - GENERAL
STANDING UP FROM CHAIR USING ARMS: A LITTLE
MOBILITY SCORE: 20
DAILY ACTIVITIY SCORE: 24
CLIMB 3 TO 5 STEPS WITH RAILING: A LITTLE
MOVING TO AND FROM BED TO CHAIR: A LITTLE
WALKING IN HOSPITAL ROOM: A LITTLE

## 2025-09-03 ASSESSMENT — PAIN DESCRIPTION - DESCRIPTORS
DESCRIPTORS: ACHING

## 2025-09-04 ENCOUNTER — APPOINTMENT (OUTPATIENT)
Dept: VASCULAR MEDICINE | Facility: CLINIC | Age: 70
End: 2025-09-04
Payer: MEDICARE

## 2025-09-04 ENCOUNTER — PATIENT OUTREACH (OUTPATIENT)
Dept: CARE COORDINATION | Age: 70
End: 2025-09-04
Payer: MEDICARE

## 2025-09-04 LAB — HOLD SPECIMEN: NORMAL

## 2025-09-05 ENCOUNTER — PATIENT OUTREACH (OUTPATIENT)
Dept: CARE COORDINATION | Age: 70
End: 2025-09-05
Payer: MEDICARE

## 2025-09-05 LAB
BACTERIA BLD CULT: NORMAL
BACTERIA BLD CULT: NORMAL

## 2025-09-06 ENCOUNTER — PATIENT OUTREACH (OUTPATIENT)
Dept: CARE COORDINATION | Age: 70
End: 2025-09-06
Payer: MEDICARE